# Patient Record
(demographics unavailable — no encounter records)

---

## 2024-10-07 NOTE — ASSESSMENT
[Educated Patient & Family about Diagnosis] : educated the patient and family about the diagnosis [FreeTextEntry1] : Intermittent vomiting associated with GE reflux REC: 1. Reassure 2. No specific diagnostic or therapeutic interventions indicated at present 3. Parents will continue to monitor portion size and frequency/consistency of BM 4. Call, f/u GI prn

## 2024-10-07 NOTE — PHYSICAL EXAM
[Well Developed] : well developed [Well Nourished] : well nourished [NAD] : in no acute distress [Pallor] : no pallor [Short For Stated Age] : not short for stated age [PERRL] : pupils were equal, round, reactive to light  [EOMI] : ~T the extraocular movements were normal and intact [icteric] : anicteric [Moist & Pink Mucous Membranes] : moist and pink mucous membranes [CTAB] : lungs clear to auscultation bilaterally [Respiratory Distress] : no respiratory distress  [Wheeze] : no wheezing  [Regular Rate and Rhythm] : regular rate and rhythm [Normal S1, S2] : normal S1 and S2 [Murmur] : no murmur [Soft] : soft  [Distended] : non distended [Tender] : non tender [Normal Bowel Sounds] : normal bowel sounds [Guarding] : no guarding [Stool Palpable] : no stool palpable [Mass ___ cm] : no masses were palpated [No HSM] : no hepatosplenomegaly appreciated [No Back Lesion] : no back lesion [Normal rectal exam] : exam was normal [Lymphadenopathy] : no lymphadenopathy  [Joint Swelling] : no joint swelling [Normal Tone] : normal tone [Focal Deficits] : no focal deficits [Well-Perfused] : well-perfused [Edema] : no edema [Cyanosis] : no cyanosis [Rash] : no rash [Jaundice] : no jaundice [Interactive] : interactive [Appropriate Affect] : appropriate affect [Appropriate Behavior] : appropriate behavior

## 2024-10-07 NOTE — HISTORY OF PRESENT ILLNESS
[de-identified] : 11 month old infant with 8 episodes of vomiting over the past 2 months. Vomiting copious, more effortless than effortful. Vomitus usually food and milk, never bilious or bloody. Parents cannot associate specific ingestions triggering symptom, but at times suspect the volume the child consumes can lead to vomiting. There is no obvious pain or dysphagia on swallowing. BM formed qd-bid, and parents do not think orf the child as constipated. Child gaining weight and growing normally and has no fevers or other systemic symptoms.